# Patient Record
Sex: FEMALE | Race: WHITE | NOT HISPANIC OR LATINO
[De-identification: names, ages, dates, MRNs, and addresses within clinical notes are randomized per-mention and may not be internally consistent; named-entity substitution may affect disease eponyms.]

---

## 2021-01-18 ENCOUNTER — RESULT REVIEW (OUTPATIENT)
Age: 36
End: 2021-01-18

## 2024-01-07 ENCOUNTER — EMERGENCY (EMERGENCY)
Facility: HOSPITAL | Age: 39
LOS: 1 days | Discharge: HOME | End: 2024-01-07
Attending: EMERGENCY MEDICINE | Admitting: EMERGENCY MEDICINE
Payer: COMMERCIAL

## 2024-01-07 VITALS
HEART RATE: 90 BPM | OXYGEN SATURATION: 98 % | HEIGHT: 68 IN | WEIGHT: 164.91 LBS | TEMPERATURE: 98 F | DIASTOLIC BLOOD PRESSURE: 86 MMHG | SYSTOLIC BLOOD PRESSURE: 121 MMHG | RESPIRATION RATE: 18 BRPM

## 2024-01-07 VITALS
TEMPERATURE: 98 F | RESPIRATION RATE: 16 BRPM | HEART RATE: 67 BPM | OXYGEN SATURATION: 99 % | SYSTOLIC BLOOD PRESSURE: 112 MMHG | DIASTOLIC BLOOD PRESSURE: 78 MMHG

## 2024-01-07 LAB
HCG UR QL: NEGATIVE — SIGNIFICANT CHANGE UP
HCG UR QL: NEGATIVE — SIGNIFICANT CHANGE UP

## 2024-01-07 PROCEDURE — 99283 EMERGENCY DEPT VISIT LOW MDM: CPT

## 2024-01-07 PROCEDURE — 99284 EMERGENCY DEPT VISIT MOD MDM: CPT

## 2024-01-07 PROCEDURE — 81025 URINE PREGNANCY TEST: CPT

## 2024-01-07 RX ORDER — LIDOCAINE 4 G/100G
1 CREAM TOPICAL ONCE
Refills: 0 | Status: COMPLETED | OUTPATIENT
Start: 2024-01-07 | End: 2024-01-07

## 2024-01-07 RX ORDER — DIAZEPAM 5 MG
1 TABLET ORAL
Qty: 15 | Refills: 0
Start: 2024-01-07 | End: 2024-01-11

## 2024-01-07 RX ORDER — LIDOCAINE 4 G/100G
1 CREAM TOPICAL
Qty: 1 | Refills: 0
Start: 2024-01-07 | End: 2024-01-13

## 2024-01-07 RX ORDER — KETOROLAC TROMETHAMINE 30 MG/ML
30 SYRINGE (ML) INJECTION ONCE
Refills: 0 | Status: DISCONTINUED | OUTPATIENT
Start: 2024-01-07 | End: 2024-01-07

## 2024-01-07 RX ORDER — DIAZEPAM 5 MG
5 TABLET ORAL ONCE
Refills: 0 | Status: DISCONTINUED | OUTPATIENT
Start: 2024-01-07 | End: 2024-01-07

## 2024-01-07 RX ORDER — ACETAMINOPHEN 500 MG
2 TABLET ORAL
Qty: 42 | Refills: 0
Start: 2024-01-07 | End: 2024-01-13

## 2024-01-07 RX ADMIN — LIDOCAINE 1 PATCH: 4 CREAM TOPICAL at 15:15

## 2024-01-07 RX ADMIN — Medication 5 MILLIGRAM(S): at 15:22

## 2024-01-07 RX ADMIN — Medication 30 MILLIGRAM(S): at 15:15

## 2024-01-07 NOTE — ED PROVIDER NOTE - PROGRESS NOTE DETAILS
TRISTIN Tavarez: Patient reassessed after meds, reports that she feels better.  Will DC with prescription of naproxen, Tylenol and Valium as needed for pain.  Patient also placed in Lizeth's follow-up wound for help with Ortho appointment

## 2024-01-07 NOTE — ED ADULT NURSE NOTE - OBJECTIVE STATEMENT
38yr old female a&ox4 arrives to ED from home notes chronic back pain, pt notes numbness at times, But + pulses extremity, extremity warm to touch, no obvious deformity or discoloration, pt notes to have been on PO steroids with no help. Pt noted worsening pain, pt denies fever chills, chest pain or sob at this time.  Milan TREVINO

## 2024-01-07 NOTE — ED PROVIDER NOTE - NSFOLLOWUPINSTRUCTIONS_ED_ALL_ED_FT
Follow up with the orthopedic specialist.     Take the prescribed medication as directed for pain     Stay hydrated    Return to the ED for worsening pain, new numbness or tingling in legs, problems moving bowel or bladder or any concerns  ***************      Back Pain    Back pain is very common in adults. The cause of back pain is rarely dangerous and the pain often gets better over time. The cause of your back pain may not be known and may include strain of muscles or ligaments, degeneration of the spinal disks, or arthritis. Occasionally the pain may radiate down your leg(s). Over-the-counter medicines to reduce pain and inflammation are often the most helpful. Stretching and remaining active frequently helps the healing process.     SEEK IMMEDIATE MEDICAL CARE IF YOU HAVE ANY OF THE FOLLOWING SYMPTOMS: bowel or bladder control problems, unusual weakness or numbness in your arms or legs, nausea or vomiting, abdominal pain, fever, dizziness/lightheadedness.

## 2024-01-07 NOTE — ED PROVIDER NOTE - CLINICAL SUMMARY MEDICAL DECISION MAKING FREE TEXT BOX
38-year-old white female presents to the emergency department here at [facility] today complaining of left lower lumbar pain radiating somewhat into the left posterior thigh.  Episode began to worsen after lifting heavy object a few weeks ago and over the past few days pain has gotten significantly worse precluding her from getting a good night sleep.  There is no problems with bowel or bladder no weakness or paresthesias no fever no chills.  No other in fact no red flag symptoms.  This case will require extensive evaluation as well as medications.  Of note patient is scheduled for MRI this Tuesday. 38-year-old white female presents to the emergency department here at [facility] today complaining of left lower lumbar pain radiating somewhat into the left posterior thigh.  Episode began to worsen after lifting heavy object a few weeks ago and over the past few days pain has gotten significantly worse precluding her from getting a good night sleep.  There is no problems with bowel or bladder no weakness or paresthesias no fever no chills.  No other in fact no red flag symptoms.  This case will require extensive evaluation as well as medications.  Of note patient is scheduled for MRI this Tuesday.

## 2024-01-07 NOTE — ED PROVIDER NOTE - PATIENT PORTAL LINK FT
You can access the FollowMyHealth Patient Portal offered by [facility] by registering at the following website: [email or web address]. By joining [employer]’s FollowMyHealth portal, you will also be able to view your health information using other applications (apps) compatible with our system. You can access the FollowMyHealth Patient Portal offered by [facility] by registering at the following website: [email or web address]. By joining [facility]’s FollowMyHealth portal, you will also be able to view your health information using other applications (apps) compatible with our system.

## 2024-01-07 NOTE — ED ADULT NURSE NOTE - FINAL NURSING ELECTRONIC SIGNATURE
07-Jan-2024 17:10 [Normal Breath Sounds] : Normal breath sounds [Normal Heart Sounds] : normal heart sounds [2+] : left 2+ [Ankle Swelling Bilaterally] : bilaterally  [Ankle Swelling On The Right] : mild [No Rash or Lesion] : No rash or lesion [Alert] : alert [Calm] : calm [JVD] : no jugular venous distention  [Carotid Bruits] : no carotid bruits [Ankle Swelling (On Exam)] : not present [Varicose Veins Of Lower Extremities] : not present [] : not present [Abdomen Tenderness] : ~T ~M No abdominal tenderness [de-identified] : WN/WD, NAD [de-identified] : NC/AT [de-identified] : FROM throughout, strength 5/5x4 [de-identified] : CNII-XII/HEYDI grossly intact

## 2024-01-07 NOTE — ED ADULT NURSE NOTE - NSFALLUNIVINTERV_ED_ALL_ED
Bed/Stretcher in lowest position, wheels locked, appropriate side rails in place/Call bell, personal items and telephone in reach/Instruct patient to call for assistance before getting out of bed/chair/stretcher/Non-slip footwear applied when patient is off stretcher/[place] to call system/Physically safe environment - no spills, clutter or unnecessary equipment/Purposeful proactive rounding/Room/bathroom lighting operational, light cord in reach Bed/Stretcher in lowest position, wheels locked, appropriate side rails in place/Call bell, personal items and telephone in reach/Instruct patient to call for assistance before getting out of bed/chair/stretcher/Non-slip footwear applied when patient is off stretcher/[place] to call system/Physically safe environment - no spills, clutter or unnecessary equipment/Purposeful proactive rounding/Room/bathroom lighting operational, light cord in reach

## 2024-01-07 NOTE — ED PROVIDER NOTE - ATTENDING APP SHARED VISIT CONTRIBUTION OF CARE
Examination reveals a well-developed well-nourished white female in mild to moderate distress with positive straight leg raise at 30 degrees left leg with normal and symmetric reflexes bilateral lower extremities and normal strength bilateral lower extremities.  I agree with plan and management outlined by PA.  Of note I did perform an independent history and physical on this patient.

## 2024-01-07 NOTE — ED PROVIDER NOTE - CARE PROVIDER_API CALL
Shyam Agarwal  Orthopaedic Surgery  [address], [address]  [place] [address]  Phone: [phone]  Fax: [phone]  Follow Up Time:    Shyam Agarwal  Orthopaedic Surgery  [address], [address]  [place] [address]  Phone: [phone]  Fax: [phone]  Follow Up Time:

## 2024-01-07 NOTE — ED PROVIDER NOTE - PHYSICAL EXAMINATION
Gen: Well appearing in NAD.   Head: atraumatic  Abd: soft, NT/ND, no rebound or guarding  Msk: No midline spinal tenderness to palpation, +TTP over the left buttock causing shooting pains down the posterior leg.  Pt ambulatory in the ED. +left SLR.  2+ pedal pulses, no neurovasc compromise noted   Neuro: AAO x3, patient moving all extremity equally, no focal neuro deficits noted  Skin: Normal for race. No rashes  Psych: Alert and oriented

## 2024-01-07 NOTE — ED ADULT TRIAGE NOTE - CHIEF COMPLAINT QUOTE
Patient walked in with c/o sciatica pain x6 weeks, worse today. Patient reports she has pain in her low back radiating down the left leg with numbness in her foot and pins and needles today. Patient reports she has had intermittent numbness in the foot but the pins and needles sensation is new. Patient denies bladder or bowel incontinence or retention. Patient reports she took naproxen, Tylenol around 430am and 5mg oxycodone two hours ago with no relief of pain. Patient is ambulating with steady gait currently.   PMhx: sciatica, ovarian cysts, endometriosis, LEAP procedure

## 2024-01-07 NOTE — ED PROVIDER NOTE - OBJECTIVE STATEMENT
38-year-old female past medical history of sciatica presents to the ED with complaints of left buttock pain radiating down the left posterior leg x 6 weeks however got worse to 1 week ago.  Reports she has tried naproxen Tylenol and a Medrol Dosepak at home with no improvement in symptoms.  Reports she took a leftover oxycodone 5 mg this morning with no help.  She has an MRI scheduled in 2 days.  She denies any fall, direct trauma, fever chills, urinary or bowel incontinence, saddle anesthesia, rashes or all other complaints.

## 2024-01-26 ENCOUNTER — TRANSCRIPTION ENCOUNTER (OUTPATIENT)
Age: 39
End: 2024-01-26

## 2024-08-22 ENCOUNTER — NON-APPOINTMENT (OUTPATIENT)
Age: 39
End: 2024-08-22